# Patient Record
Sex: MALE | Race: BLACK OR AFRICAN AMERICAN | NOT HISPANIC OR LATINO | URBAN - METROPOLITAN AREA
[De-identification: names, ages, dates, MRNs, and addresses within clinical notes are randomized per-mention and may not be internally consistent; named-entity substitution may affect disease eponyms.]

---

## 2022-11-04 ENCOUNTER — OFFICE VISIT (OUTPATIENT)
Dept: FAMILY MEDICINE CLINIC | Facility: CLINIC | Age: 42
End: 2022-11-04

## 2022-11-04 VITALS
HEIGHT: 67 IN | BODY MASS INDEX: 28.94 KG/M2 | DIASTOLIC BLOOD PRESSURE: 84 MMHG | OXYGEN SATURATION: 98 % | HEART RATE: 104 BPM | SYSTOLIC BLOOD PRESSURE: 136 MMHG | WEIGHT: 184.4 LBS | TEMPERATURE: 97.7 F | RESPIRATION RATE: 19 BRPM

## 2022-11-04 DIAGNOSIS — Z11.59 NEED FOR HEPATITIS C SCREENING TEST: ICD-10-CM

## 2022-11-04 DIAGNOSIS — M25.562 CHRONIC PAIN OF LEFT KNEE: ICD-10-CM

## 2022-11-04 DIAGNOSIS — R26.9 ABNORMAL GAIT: ICD-10-CM

## 2022-11-04 DIAGNOSIS — Z76.89 ENCOUNTER TO ESTABLISH CARE: Primary | ICD-10-CM

## 2022-11-04 DIAGNOSIS — G89.29 CHRONIC PAIN OF LEFT KNEE: ICD-10-CM

## 2022-11-04 DIAGNOSIS — Z11.4 ENCOUNTER FOR SCREENING FOR HIV: ICD-10-CM

## 2022-11-04 RX ORDER — IBUPROFEN 600 MG/1
600 TABLET ORAL EVERY 6 HOURS PRN
Qty: 30 TABLET | Refills: 0 | Status: SHIPPED | OUTPATIENT
Start: 2022-11-04 | End: 2022-11-18
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
## 2022-11-05 ENCOUNTER — TELEPHONE (OUTPATIENT)
Dept: FAMILY MEDICINE CLINIC | Facility: CLINIC | Age: 42
End: 2022-11-05

## 2022-11-05 PROBLEM — G89.29 CHRONIC PAIN OF LEFT KNEE: Status: ACTIVE | Noted: 2022-11-05

## 2022-11-05 PROBLEM — Z76.89 ENCOUNTER TO ESTABLISH CARE: Status: ACTIVE | Noted: 2022-11-05

## 2022-11-05 PROBLEM — R26.9 ABNORMAL GAIT: Status: ACTIVE | Noted: 2022-11-05

## 2022-11-05 PROBLEM — M25.562 CHRONIC PAIN OF LEFT KNEE: Status: ACTIVE | Noted: 2022-11-05

## 2022-11-05 NOTE — ASSESSMENT & PLAN NOTE
Significant gait abnormality on examination   Pt would benefit from specialist investigation/evaluation     - Referral to Neurology sent

## 2022-11-05 NOTE — TELEPHONE ENCOUNTER
----- Message from Bradford Samuels MD sent at 11/5/2022  1:43 AM EDT -----  Good morning everyone! Can someone please contact this gentleman and help him establish an appointment with Neurology  Very much appreciated!

## 2022-11-05 NOTE — ASSESSMENT & PLAN NOTE
Will treat conservatively at this time   Likely second to ongoing gait abnormality     - Begin ibuprofen 600mg q6 prn for 14 days, diclofenac sodium 1% QID PRN  - Consider joint injection and PT if pain remains uncontrolled  - ED precautions given  - f/u in one month

## 2022-11-05 NOTE — ASSESSMENT & PLAN NOTE
Will order base lab screenings  Immunization to be addressed at next visit  Pt to bring in covid vaccination form      - BMP, Ha1c, lipid panel, Hep C, HIV  - Influenza and Tdap to be administered on next visit

## 2022-11-10 ENCOUNTER — APPOINTMENT (OUTPATIENT)
Dept: LAB | Facility: CLINIC | Age: 42
End: 2022-11-10

## 2022-11-10 DIAGNOSIS — Z76.89 ENCOUNTER TO ESTABLISH CARE: ICD-10-CM

## 2022-11-10 DIAGNOSIS — Z11.59 NEED FOR HEPATITIS C SCREENING TEST: ICD-10-CM

## 2022-11-10 DIAGNOSIS — Z11.4 ENCOUNTER FOR SCREENING FOR HIV: ICD-10-CM

## 2022-11-10 LAB
ANION GAP SERPL CALCULATED.3IONS-SCNC: 7 MMOL/L (ref 4–13)
BUN SERPL-MCNC: 10 MG/DL (ref 5–25)
CALCIUM SERPL-MCNC: 9.8 MG/DL (ref 8.3–10.1)
CHLORIDE SERPL-SCNC: 98 MMOL/L (ref 96–108)
CHOLEST SERPL-MCNC: 281 MG/DL
CO2 SERPL-SCNC: 25 MMOL/L (ref 21–32)
CREAT SERPL-MCNC: 0.56 MG/DL (ref 0.6–1.3)
EST. AVERAGE GLUCOSE BLD GHB EST-MCNC: 315 MG/DL
GFR SERPL CREATININE-BSD FRML MDRD: 128 ML/MIN/1.73SQ M
GLUCOSE P FAST SERPL-MCNC: 365 MG/DL (ref 65–99)
HBA1C MFR BLD: 12.6 %
HCV AB SER QL: NORMAL
HDLC SERPL-MCNC: 30 MG/DL
LDLC SERPL DIRECT ASSAY-MCNC: 184 MG/DL (ref 0–100)
POTASSIUM SERPL-SCNC: 4.8 MMOL/L (ref 3.5–5.3)
SODIUM SERPL-SCNC: 130 MMOL/L (ref 135–147)
TRIGL SERPL-MCNC: 462 MG/DL

## 2022-11-11 LAB — HIV 1+2 AB+HIV1 P24 AG SERPL QL IA: NORMAL

## 2022-12-06 ENCOUNTER — TELEPHONE (OUTPATIENT)
Dept: FAMILY MEDICINE CLINIC | Facility: CLINIC | Age: 42
End: 2022-12-06

## 2022-12-06 NOTE — TELEPHONE ENCOUNTER
----- Message from Angela Campos MD sent at 12/5/2022  2:16 PM EST -----  Can someone give this gentleman a call and schedule an appointment as soon as possible with me  I would like to review his test results with him  Please schedule for a 40min if possible  Thank you!

## 2022-12-18 PROBLEM — E11.65 TYPE 2 DIABETES MELLITUS WITH HYPERGLYCEMIA, WITHOUT LONG-TERM CURRENT USE OF INSULIN (HCC): Status: ACTIVE | Noted: 2022-12-18

## 2022-12-18 PROBLEM — E78.2 MIXED HYPERLIPIDEMIA: Status: ACTIVE | Noted: 2022-12-18

## 2022-12-18 NOTE — ASSESSMENT & PLAN NOTE
Lab Results   Component Value Date    HGBA1C 12 6 (H) 11/10/2022     Pt with no previous insulin use  Significant counseling regarding new diagnosis and monitoring of condition provided  Will opt to not begin insulin at this time after shared decision making about available options with pt  All questions answered  - Will begin metformin 1000mg BID: Take half a pill once a day for 3 days  Then take one full pill for 3 days  Finally take one pill twice a day    - Anabelle Valdes in 3 months from now  - Referral sent to Diabetes education  - Advised to schedule IRIS exam as soon as possible  - Performed diabetic foot exam today 12/19/2022  - Urine microalbumin/creatinine ratio collected today: result of 212  - Flu and pneumo vaccine administered today  - return in 2 weeks for random glucose check and medication adjustment

## 2022-12-18 NOTE — PROGRESS NOTES
Name: Gail Villatoro      : 1980      MRN: [id number]  Encounter Provider: Alli Bauer MD  Encounter Date: 2022   Encounter department: [address] [place] [address]     1  Type 2 diabetes mellitus with hyperglycemia, without long-term current use of insulin ([facility])  Assessment & Plan:    Lab Results   Component Value Date    HGBA1C 12 6 (H) 11/10/2022     Pt with no previous insulin use  Significant counseling regarding new diagnosis and monitoring of condition provided  Will opt to not begin insulin at this time after shared decision making about available options with pt  All questions answered  - Will begin metformin 1000mg BID: Take half a pill once a day for 3 days  Then take one full pill for 3 days  Finally take one pill twice a day  - Lonnie Donis in 3 months from now  - Referral sent to Diabetes education  - Advised to schedule IRIS exam as soon as possible  - Performed diabetic foot exam today 2022  - Urine microalbumin/creatinine ratio collected today: result of 212  - Flu and pneumo vaccine administered today  - return in 2 weeks for random glucose check and medication adjustment    Orders:  -     metFORMIN (GLUCOPHAGE) 1000 MG tablet; Take 1 tablet (1,000 mg total) by mouth 2 (two) times a day with meals Take half a pill once a day for 3 days  Then take one full pill for 3 days  Finally take one pill twice a day  -     Ambulatory Referral to Diabetic Education; Future  -     POCT urine microalbumin/creatinine  -     Microalbumin / creatinine urine ratio    2  Mixed hyperlipidemia  Assessment & Plan:  - Will begin atorvastatin 40mg daily and get lipid panel in 3 months      Orders:  -     atorvastatin (LIPITOR) 40 mg tablet; Take 1 tablet (40 mg total) by mouth daily  -     Lipid Panel with Direct LDL reflex; Future; Expected date: 2023    3   Abnormal gait  Assessment & Plan:  Reminded pt of previously placed Neurology consult  He will speak to front staff for contact information  4  Flu vaccine need  -     influenza vaccine, quadrivalent, 0 5 mL, preservative-free, for adult and pediatric patients 6 mos+ (AFLURIA, FLUARIX, FLULAVAL, FLUZONE)    5  Need for pneumococcal vaccine  -     Pneumococcal Conjugate Vaccine 20-valent (Pcv20)         Subjective      42yo male with no significant PMH presenting to clinic for review of recent lab results  Currently endorsing no other concerns  Review of Systems   Constitutional: Positive for fatigue  Negative for chills and fever  HENT: Negative for congestion, rhinorrhea, sore throat and trouble swallowing  Eyes: Negative for visual disturbance  Respiratory: Negative for cough, chest tightness and shortness of breath  Cardiovascular: Negative for chest pain, palpitations and leg swelling  Gastrointestinal: Negative for abdominal pain, blood in stool, constipation, diarrhea, nausea and vomiting  Endocrine: Positive for polydipsia  Genitourinary: Negative for dysuria and hematuria  Musculoskeletal: Positive for gait problem  Skin: Negative for color change and rash  Neurological: Positive for weakness (chronic BL LE) and numbness (chronic BL LE)  Negative for dizziness, seizures, syncope and headaches  Current Outpatient Medications on File Prior to Visit   Medication Sig   • Diclofenac Sodium (VOLTAREN) 1 % Apply 4 g topically 4 (four) times a day   • ibuprofen (MOTRIN) 600 mg tablet Take 1 tablet (600 mg total) by mouth every 6 (six) hours as needed for mild pain for up to 14 days       Objective     /90 (BP Location: Right arm, Patient Position: Sitting, Cuff Size: Standard)   Pulse 74   Temp 98 6 °F (37 °C) (Temporal)   Resp 18   Ht 5' 7" (1 702 m)   Wt 84 4 kg (186 lb)   SpO2 98%   BMI 29 13 kg/m²     Physical Exam  Vitals and nursing note reviewed  Constitutional:       General: He is not in acute distress       Appearance: Normal appearance  He is normal weight  He is not ill-appearing, toxic-appearing or diaphoretic  HENT:      Head: Normocephalic and atraumatic  Eyes:      General: No scleral icterus  Right eye: No discharge  Left eye: No discharge  Conjunctiva/sclera: Conjunctivae normal    Cardiovascular:      Rate and Rhythm: Normal rate and regular rhythm  Pulses: Normal pulses  no weak pulses          Dorsalis pedis pulses are 2+ on the right side and 2+ on the left side  Posterior tibial pulses are 2+ on the right side and 2+ on the left side  Heart sounds: Normal heart sounds  No murmur heard  No friction rub  No gallop  Pulmonary:      Effort: Pulmonary effort is normal  No respiratory distress  Breath sounds: Normal breath sounds  No stridor  No wheezing, rhonchi or rales  Musculoskeletal:         General: No swelling or tenderness  Cervical back: Normal range of motion  Right lower leg: No edema  Left lower leg: No edema  Feet:      Right foot:      Skin integrity: No ulcer, skin breakdown, erythema, warmth, callus or dry skin  Left foot:      Skin integrity: No ulcer, skin breakdown, erythema, warmth, callus or dry skin  Skin:     General: Skin is warm and dry  Capillary Refill: Capillary refill takes less than 2 seconds  Coloration: Skin is not jaundiced  Neurological:      Mental Status: He is alert and oriented to person, place, and time  Mental status is at baseline  Psychiatric:         Mood and Affect: Mood normal          Behavior: Behavior normal             Patient's shoes and socks removed  Right Foot/Ankle   Right Foot Inspection  Skin Exam: skin normal and skin intact  No dry skin, no warmth, no callus, no erythema, no maceration, no abnormal color, no pre-ulcer, no ulcer and no callus  Toe Exam: ROM and strength within normal limits   No swelling, no tenderness and erythema    Sensory   Proprioception: intact  Monofilament testing: intact    Vascular  Capillary refills: < 3 seconds  The right DP pulse is 2+  The right PT pulse is 2+  Left Foot/Ankle  Left Foot Inspection  Skin Exam: skin normal and skin intact  No dry skin, no warmth, no erythema, no maceration, normal color, no pre-ulcer, no ulcer and no callus  Toe Exam: ROM and strength within normal limits  No swelling, no tenderness and no erythema  Sensory   Proprioception: intact  Monofilament testing: intact    Vascular  Capillary refills: < 3 seconds  The left DP pulse is 2+  The left PT pulse is 2+       Assign Risk Category  No deformity present  No loss of protective sensation  No weak pulses  Risk: 0         Tangela Gupta MD

## 2022-12-19 ENCOUNTER — OFFICE VISIT (OUTPATIENT)
Dept: FAMILY MEDICINE CLINIC | Facility: CLINIC | Age: 42
End: 2022-12-19

## 2022-12-19 VITALS
TEMPERATURE: 98.6 F | OXYGEN SATURATION: 98 % | DIASTOLIC BLOOD PRESSURE: 90 MMHG | RESPIRATION RATE: 18 BRPM | WEIGHT: 186 LBS | BODY MASS INDEX: 29.19 KG/M2 | SYSTOLIC BLOOD PRESSURE: 148 MMHG | HEIGHT: 67 IN | HEART RATE: 74 BPM

## 2022-12-19 DIAGNOSIS — E11.65 TYPE 2 DIABETES MELLITUS WITH HYPERGLYCEMIA, WITHOUT LONG-TERM CURRENT USE OF INSULIN (HCC): Primary | ICD-10-CM

## 2022-12-19 DIAGNOSIS — E78.2 MIXED HYPERLIPIDEMIA: ICD-10-CM

## 2022-12-19 DIAGNOSIS — R26.9 ABNORMAL GAIT: ICD-10-CM

## 2022-12-19 DIAGNOSIS — Z23 FLU VACCINE NEED: ICD-10-CM

## 2022-12-19 DIAGNOSIS — Z23 NEED FOR PNEUMOCOCCAL VACCINE: ICD-10-CM

## 2022-12-19 LAB
CREAT UR-MCNC: 19.7 MG/DL
MICROALBUMIN UR-MCNC: 41.8 MG/L (ref 0–20)
MICROALBUMIN/CREAT 24H UR: 212 MG/G CREATININE (ref 0–30)
SL AMB POCT UR MICROALBUMIN: 212

## 2022-12-19 RX ORDER — ATORVASTATIN CALCIUM 40 MG/1
40 TABLET, FILM COATED ORAL DAILY
Qty: 90 TABLET | Refills: 3 | Status: SHIPPED | OUTPATIENT
Start: 2022-12-19

## 2022-12-20 NOTE — ASSESSMENT & PLAN NOTE
Reminded pt of previously placed Neurology consult  He will speak to front staff for contact information

## 2023-01-20 ENCOUNTER — TELEPHONE (OUTPATIENT)
Dept: DIABETES SERVICES | Facility: OTHER | Age: 43
End: 2023-01-20

## 2023-01-20 NOTE — TELEPHONE ENCOUNTER
Called pt regarding diabetes education program however speaks Taiwanese more fluently  Difficulty communicating with him  Forwarded to appropriate educator

## 2023-01-23 ENCOUNTER — OFFICE VISIT (OUTPATIENT)
Dept: DIABETES SERVICES | Facility: OTHER | Age: 43
End: 2023-01-23

## 2023-01-23 DIAGNOSIS — E11.65 TYPE 2 DIABETES MELLITUS WITH HYPERGLYCEMIA, WITHOUT LONG-TERM CURRENT USE OF INSULIN (HCC): ICD-10-CM

## 2023-01-23 NOTE — PROGRESS NOTES
Type 2 Diabetes Class Assessment    HPI: Met with Alexander Ospina for DSME Initial visit  Bong Dumont has Type 2 Diabetes  Patient is Kazakh speaking  This visit was performed with a Kazakh speaking provider  Bong Dumont has limited knowledge of diabetes  Recap of his medications revealed he is only taking metformin once a day  He knows it's prescribed twice a day, but feels stomach upset in the morning  Went over side effects and encouraged him to take as prescribed as A1c is too high  His biggest barrier is lack of time preparing foods at home  He does eat a variety of fruits and vegetables, encouraged more non-starchy  Educated on carbohydrate sources, portion sizes, label reading  He will return in one month for DSME class  Diabetes Assessment  Visit Type: Initial visit  Present at Session: patient   Medical Diagnosis/ICD 10: E11 65  Special Learning Needs: No  Barriers to Learning: no barriers    How do you feel about making lifestyle changes at this time? "I don't know where to start"  How would you rate your current knowledge of diabetes? fair  How confident are you that will be able to take better control of your diabetes?: good    How long have you had diabetes? He estimates over 4 years   Have you had diabetes education in the past?: No  Do you have any family members with diabetes?: Yes  Do you monitor your blood sugar? no  Type of blood sugar monitor: he does not know a meter  He had a previous meter in [place], recommended [employer] to start with   Blood sugar log with patient today and reviewed by educator?: No     Any financial concerns pertaining to your diabetes supplies, medication or care?: Yes  Have you ever experienced hypoglycemia?:  No  Have you ever been hospitalized or gone to the ER due to your blood sugars?: No  How do you treat low blood sugars?: unsure, patient was educated   How do you treat high blood sugars?  Unsure, patient was educated   Do you wear a Diabetes I D ?: no  Where do you dispose of your sharps (needles,lancetes)?: does not have sharps to dispose of at this time; when he returns with glucometer he will be given information on this     Ht Readings from Last 1 Encounters:   12/19/22 5' 7" (1 702 m)     Wt Readings from Last 1 Encounters:   12/19/22 84 4 kg (186 lb)       bmi   Weight Change: Yes he feels he has lost some weight    Diet Assessment    Food Log: Completed via the method of food recall    Leonie Kelly admits his biggest barrier is eating out and eating whatever he wants  His wife started working and has left little time to prepare home cooked meals  He often eats whatever he can find  He knows this is a problem, so beginning today he enlisted the help of a service to prepare meals for him  He showed me the pictures, meals include appropriate carb, protein and vegetables  Breakfast: usually eats between 8:30 and 9, this morning he had 2 plantains, cheese,, eggs, salami, coffee with a bit of milk   Morning Snack: n/a  Lunch his times are not consistent, anytime between 1 and 3, again whatever he can find, hamburger, chilli fries, fried chicken,   Afternoon Snack: n/a  Dinner: He eats dinner at 7 pm, and then goes to bed at 8    Last night he had buffalo wings the other night it was  pizza,   Evening Snack: no snacks   Beverages: water, orange juice twice a week   Eating out/Take out: everyday for lunch     Do you follow any special diet presently?: No  Who cooks at home?:  spouse  Who does the grocery shopping?: patient and spouse     Activity Assessment    Exercise: limited; he does not feel stable because of a condition with his leg      Lifestyle/Social Assessment    Racial/ethnic group:                                       Primary Language: [place] Korean  Marital Status:   Education Level: not assessed   Work status: Part Time  Type of job and hours: works as a   Who lives in your household?: spouse and children  Who is you primary support person(s): spouse   Describe your quality of life currently?: good  Any concerns for your safety?: No  Any Temple or cultural practices that may affect your diabetes care: No  Do you have a decrease or loss of hearing?: No  Do you have a decrease or loss of vision?: at times he has noted blurred vision; he does not have insurance for eye exam   When was the last time you had an ophthalmology exam?:not performed   When was the last time you had dental exam?: more than 2 years ago   Do you check your feet for cracks, sores, debris?: Yes  When was the last time you had podiatry or foot exam?: 12/19/22  Last flu shot?: 12/19/22  Pneumonia shot?: Yes - 12/19/22      Lab Results   Component Value Date    HGBA1C 12 6 (H) 11/10/2022     Lab Results   Component Value Date    HDL 30 (L) 11/10/2022    [address]  11/10/2022      Comment:      Calculated LDL invalid, triglycerides >400 mg/dl    TRIG 462 (H) 11/10/2022     No results found for: Freddie Dugan      The patient's history was reviewed and updated as appropriate: allergies, current medications  Intervention    Diabetes Overview :   Aram Wei was instructed on basic concepts of diabetes, including identifying role of diabetes self management, basic pathophysiology and types of diabetes, A1c and blood sugar targets  Aram Wei has good understanding of material covered  Taking Medications: Instructed patient on action, side effects, efficacy, prescribed dosage and appropriate timing and frequency of administration of his diabetes medication  Aram Wei has good understanding of material covered  Monitoring Blood Sugars    Testing frequency: Test sugars before a meal and 2hr after the same meal, rotating between breakfast, lunch, and dinner  Test sugars twice a day (3 days a week, 7 days a week)       Goal Blood Sugars:   Premeal , even better <110  2hr after a meal <180, even better <140  A1C <7%, even better <6  5%     Hypoglycemia: Instructed patient on definition/risk of hypoglycemia, treatment, causes/symptoms, when to notify provider of lows, prevention of hypoglycemia and exercise precautions  Comments: Keon Ryan understanding of hypoglycemia concepts      Physical Activity: Discussed benefits of physical activity to optimize blood glucose control, encouraged activity at patient is physically able  Always consult a physician prior to starting an exercise program   Comments: Keon Ryan understanding of hypoglycemia concepts        Diabetes Education Record  Lane Rainey received the following handouts: Planning Healthy Meals, diabetic friendly snacks, symptoms of hyper and hypoglycemia  Living Well with diabetes class information  Information provided in Hebrew  Patient response to instruction    Comprehensiongood  Motivationgood  Expected Compliancegood  Response to Teachback: 100%, demonstrated understanding    Begin Time: 11:00  End Time: 12: 00  Referring Provider: David Ventura MD    Thank you for referring your patient to [address] [place] [address], it was a pleasure working with them today  Please feel free to call with any questions or concerns      Tyra Hogue RDN   [address] [facility] [address] [phone]

## 2023-02-18 DIAGNOSIS — E11.65 TYPE 2 DIABETES MELLITUS WITH HYPERGLYCEMIA, WITHOUT LONG-TERM CURRENT USE OF INSULIN (HCC): ICD-10-CM

## 2023-02-20 ENCOUNTER — TELEPHONE (OUTPATIENT)
Dept: DIABETES SERVICES | Facility: OTHER | Age: 43
End: 2023-02-20

## 2023-02-22 ENCOUNTER — TELEPHONE (OUTPATIENT)
Dept: DIABETES SERVICES | Facility: OTHER | Age: 43
End: 2023-02-22

## 2023-02-23 ENCOUNTER — TELEPHONE (OUTPATIENT)
Dept: DIABETES SERVICES | Facility: OTHER | Age: 43
End: 2023-02-23

## 2023-04-21 DIAGNOSIS — E11.65 TYPE 2 DIABETES MELLITUS WITH HYPERGLYCEMIA, WITHOUT LONG-TERM CURRENT USE OF INSULIN (HCC): ICD-10-CM

## 2023-07-18 ENCOUNTER — TELEPHONE (OUTPATIENT)
Dept: FAMILY MEDICINE CLINIC | Facility: CLINIC | Age: 43
End: 2023-07-18

## 2023-07-18 NOTE — TELEPHONE ENCOUNTER
1st attempt at 12:14 called patient and left message on voicemail to call the office to schedule a follow up DM. Pt hasn't been seen in the office since December. When patient calls back please help in schedule appointment. Thank you.

## 2023-07-20 NOTE — TELEPHONE ENCOUNTER
2nd attempt at calling patient    Spoke with patient,patient is aware he is due for check up    patient is scheduled for 7/27

## 2024-05-14 ENCOUNTER — TELEPHONE (OUTPATIENT)
Dept: FAMILY MEDICINE CLINIC | Facility: CLINIC | Age: 44
End: 2024-05-14

## 2024-09-10 ENCOUNTER — TELEPHONE (OUTPATIENT)
Dept: FAMILY MEDICINE CLINIC | Facility: CLINIC | Age: 44
End: 2024-09-10

## 2024-09-10 NOTE — TELEPHONE ENCOUNTER
Received message from previous provider to call and schedule appt with new pcp.     first attempt to contact patient. left message to return my call on answering machine. If pt calls back please assist with scheduling appt.

## 2024-09-12 NOTE — TELEPHONE ENCOUNTER
third attempt to contact patient. left message to return my call on answering machine. Letter sent.